# Patient Record
Sex: MALE | Race: ASIAN
[De-identification: names, ages, dates, MRNs, and addresses within clinical notes are randomized per-mention and may not be internally consistent; named-entity substitution may affect disease eponyms.]

---

## 2019-08-07 ENCOUNTER — HOSPITAL ENCOUNTER (OUTPATIENT)
Dept: HOSPITAL 76 - DI.WCP | Age: 34
Discharge: HOME | End: 2019-08-07
Attending: FAMILY MEDICINE
Payer: COMMERCIAL

## 2019-08-07 DIAGNOSIS — M79.672: Primary | ICD-10-CM

## 2019-08-08 NOTE — XRAY REPORT
Reason:  LEFT FOOT PAIN

Procedure Date:  08/07/2019   

Accession Number:  [id number] / [id number]                    

Procedure:  WCP - Foot 3 View LT CPT Code:  

 

FULL RESULT:

 

 

EXAM:

LEFT FOOT RADIOGRAPHY

 

EXAM DATE: 8/7/2019 11:50 AM.

 

CLINICAL HISTORY: LEFT FOOT PAIN.

 

COMPARISON: None.

 

TECHNIQUE: 3 views.

 

FINDINGS:

Bones: Normal. No fractures or bone lesions.

 

Joints: Normal. No subluxations.

 

Soft Tissues: Normal. No soft tissue swelling.

IMPRESSION: Normal foot radiography.

 

[facility]